# Patient Record
Sex: FEMALE | URBAN - METROPOLITAN AREA
[De-identification: names, ages, dates, MRNs, and addresses within clinical notes are randomized per-mention and may not be internally consistent; named-entity substitution may affect disease eponyms.]

---

## 2018-03-25 ENCOUNTER — EMERGENCY (EMERGENCY)
Facility: HOSPITAL | Age: 58
LOS: 1 days | Discharge: HOME | End: 2018-03-25
Attending: EMERGENCY MEDICINE | Admitting: EMERGENCY MEDICINE
Payer: SELF-PAY

## 2018-03-25 VITALS
OXYGEN SATURATION: 100 % | RESPIRATION RATE: 18 BRPM | HEART RATE: 94 BPM | SYSTOLIC BLOOD PRESSURE: 164 MMHG | DIASTOLIC BLOOD PRESSURE: 96 MMHG

## 2018-03-25 PROCEDURE — 82962 GLUCOSE BLOOD TEST: CPT

## 2018-03-25 PROCEDURE — 99283 EMERGENCY DEPT VISIT LOW MDM: CPT

## 2018-03-25 NOTE — ED PROVIDER NOTE - SHIFT CHANGE DETAILS
***ATTENDING ADDENDUM (Dr. Jesús Earl): I have received handoff from Kathy. Reassess patient and determine need for diagnostics. Likely need for social work consultation in AM. Will continue to observe and monitor closely.

## 2018-03-25 NOTE — ED PROVIDER NOTE - DIAGNOSIS COUNSELING, MDM
conducted a detailed discussion... I had a detailed discussion with the patient and/or guardian regarding the historical points, exam findings, and any diagnostic results supporting the discharge/admit diagnosis. **ATTENDING ADDENDUM (Dr. Jesús Earl): patient declined and was not cooperative with attempts at conversation and education throughout ED course.

## 2018-03-25 NOTE — ED ADULT NURSE REASSESSMENT NOTE - NS ED NURSE REASSESS COMMENT FT1
Patient refuses IV when attempted. Patient swings her arms and begins shouting when attempted. Attending Kathy made aware. MD Chavez states she will speak to patient and it is ok to hold off on IV and labs for now.

## 2018-03-25 NOTE — ED PROVIDER NOTE - PLAN OF CARE
ATTENDING ADDENDUM (Dr. Jesús Earl): Goals of care include resolution of emergent/urgent symptoms and concerns, and restoration to baseline level of homeostasis. ATTENDING ADDENDUM (Dr. Jesús Earl): (1) anticipatory guidance provided  (2) rest  (3) outpatient follow-up with your primary care physician/provider (4) return if symptoms worsen, persist, or do not resolve (5) followup with  (materials provided during ED encounter)

## 2018-03-25 NOTE — ED PROVIDER NOTE - OBJECTIVE STATEMENT
57F with unknown PMH BIB EMS after being found on the street as per triage. Patient upon evaluation refusing any evaluation, refusing to speak with providers, aggressive when attempts for physical exam made. Patient does not appear to be in any distress. Patient stating to be left alone to sleep. 57F with unknown PMH BIB EMS after being found on the street as per triage. Patient upon evaluation refusing any evaluation, refusing to speak with providers, aggressive when attempts for physical exam made. Patient does not appear to be in any distress. Patient stating to be left alone to sleep. Appears undomiciled. 57F with unknown PMH BIB EMS after being found on the street as per triage "wanting to be cleaned up". Patient upon evaluation refusing any evaluation, refusing to speak with providers, aggressive when attempts for physical exam made. Patient does not appear to be in any distress. Patient stating to be left alone to sleep. Appears undomiciled.

## 2018-03-25 NOTE — ED PROVIDER NOTE - CONDUCTED A DETAILED DISCUSSION WITH PATIENT AND/OR GUARDIAN REGARDING, MDM
need for outpatient follow-up/return to ED if symptoms worsen, persist or questions arise/**ATTENDING ADDENDUM (Dr. Jesús Earl): Anticipatory guidance provided.

## 2018-03-25 NOTE — ED PROVIDER NOTE - CARE PLAN
Principal Discharge DX:	Homeless single person  Goal:	ATTENDING ADDENDUM (Dr. Jesús Earl): Goals of care include resolution of emergent/urgent symptoms and concerns, and restoration to baseline level of homeostasis.  Assessment and plan of treatment:	ATTENDING ADDENDUM (Dr. Jesús Earl): (1) anticipatory guidance provided  (2) rest  (3) outpatient follow-up with your primary care physician/provider (4) return if symptoms worsen, persist, or do not resolve (5) followup with  (materials provided during ED encounter)

## 2018-03-25 NOTE — ED PROVIDER NOTE - PROGRESS NOTE DETAILS
**ATTENDING ADDENDUM (Dr. Jesús Earl): patient serially evaluated throughout ED course. NO acute deterioration up to this time in the ED. Patient remains considerably agitated when provoked (when asked questions) but is able to speak without slurring words and does open eyes spontaneously. Appears to be appropriately angered when bothered, but then is consolable. Able to protect airway. Poor hygiene and disheveled. Prefers to sleep but easily arousable. Does not want or consent to diagnostics at this time. Offered room and food to clean self; declined. For social work evaluation in the AM. Will continue to observe and monitor closely. Anticipatory guidance provided. Keyla: patient noted to be ambulatory, yelling for directions to bathroom, continuing to refuse further discussions. Patient subsequently returned to bed and refused further interaction. Keyla: patient noted to be ambulatory, yelling for directions to bathroom, continuing to refuse further discussions. Patient subsequently returned to bed and refused further interaction.  **ATTENDING ADDENDUM (Dr. Jesús Earl): agree with above notation by EM resident Keyla. Ambulatory without support. Still argumentative at times but consolable. Will continue to observe and monitor closely. Will likely require social work consultation/evaluation in AM. **ATTENDING ADDENDUM (Dr. Jesús Earl): patient serially evaluated throughout ED course. NO acute deterioration up to this time in the ED. However, patient, although easily arousable, demonstrates agitation at all staff (even  Tali) despite attempts at communication and all beneficent actions. I do NOT believe this patient is psychotic, suicidal, homicidal, or demonstrating hallucinations, illusions, or paranoia. I do NOT believe this patient is demonstrating hypoxia, intoxication, or other findings indicating emergent medical, surgical, infectious, malignant, OB-GYN, psychiatric or other worrisome signs or symptoms (except belligerence) that warrant emergent admission and forced involuntary ED diagnostics/therapeutics. Patient appears to demonstrate enough autonomy at this time. Therefore, Agree with discharge with close outpatient followup with  and medicine clinic or other primary care physician/provider as noted in EMR.

## 2018-03-25 NOTE — ED PROVIDER NOTE - MEDICAL DECISION MAKING DETAILS
57F unknown hx found on street. Refusing all services to be reassessed 57F unknown hx found on street. Refusing all services to be reassessed    JUANA Chavez MD: Pt is a 58 y/o female with unknown PMH who was BIB EMS after being found on the streets, appearing confused. Pt. asked for someone to "clean me up" on arrival and to "just let me sleep." Pt. appeared to be undomiciled, as her clothes were dirty and she had belongings in a bag with her. Pt is uncooperative, unwilling to open eyes, speak with examiners or answer questions. Pt pulls away and in response to pain, performing purposeful movements, pulling covers over her head. No external evidence of trauma on exam.  DDx: homeless, psychiatric illness, medical d/o  Plan: medical evaluation, social work in AM 57F unknown hx found on street. Refusing all services to be reassessed. Does not appear to have any gross injury.     JUANA Chavez MD: Pt is a 58 y/o female with unknown PMH who was BIB EMS after being found on the streets, appearing confused. Pt. asked for someone to "clean me up" on arrival and to "just let me sleep." Pt. appeared to be undomiciled, as her clothes were dirty and she had belongings in a bag with her. Pt is uncooperative, unwilling to open eyes, speak with examiners or answer questions. Pt pulls away and in response to pain, performing purposeful movements, pulling covers over her head. No external evidence of trauma on exam.  DDx: homeless, psychiatric illness, medical d/o  Plan: medical evaluation, social work in AM

## 2018-03-26 VITALS
TEMPERATURE: 97 F | RESPIRATION RATE: 15 BRPM | DIASTOLIC BLOOD PRESSURE: 69 MMHG | SYSTOLIC BLOOD PRESSURE: 128 MMHG | OXYGEN SATURATION: 99 % | HEART RATE: 96 BPM

## 2018-03-26 NOTE — ED ADULT NURSE REASSESSMENT NOTE - NS ED NURSE REASSESS COMMENT FT1
Patient continually refusing all care. Discharged home to follow up at medicine clinic. When patient instructed that she will be discharged, patient verbally abusive to ED RN and turn over in bed so backside facing nurse. RN notify charge nurse and security called in order to facilitate safe discharge.

## 2018-03-26 NOTE — ED ADULT NURSE REASSESSMENT NOTE - NS ED NURSE REASSESS COMMENT FT1
Report received from Evelia STEINER. Vital signs performed at 630 WNL. Upon day ED RN patient assessment, patient refusing vital signs being performed. Patient states "get the fuck away" when ED RN attempt assessment. MD notified. Night shift RNs confirm patient denying all services. Patient is awaiting Social Work this morning. Patient resting in stretcher. No acute distress noted. Will continue to monitor. Report received from Evelia STEINER. Vital signs performed at 630 WNL. Upon day ED RN patient assessment, patient refusing vital signs being performed. Patient states "get the fuck away" when ED RN attempt assessment. Rajat GOLDBERG notified. Night shift RNs confirm patient denying all services. Patient is awaiting Social Work this morning. Patient resting in stretcher. No acute distress noted. Will continue to monitor.

## 2018-03-26 NOTE — ED ADULT NURSE REASSESSMENT NOTE - NS ED NURSE REASSESS COMMENT FT1
Rajat GOLDBERG and social work at bedside. Patient cursing at staff. Refusing to allow MD to reassess patient

## 2018-03-26 NOTE — ED ADULT NURSE REASSESSMENT NOTE - NS ED NURSE REASSESS COMMENT FT1
As per MD Ramires, continue to allow patient to sleep. Probable SW consult in the AM. Patient is sleeping, easily arousable, VSS, NAD.

## 2022-04-21 NOTE — ED PROVIDER NOTE - DISCHARGE DATE
April 21, 2022        Patricia Gaitan  [address]  [place] [address]        David Gomez,    Thank you for taking the time to talk with me today.  During our conversation, we talked about tools and resources to lower your health risks and reach your personal goals for healthy living.       I have enclosed the following resources for you to review:     Diabetes In Control Booklet, Diabetes Action Plan    I would like to call you and follow up on the resources I am sending you.  I can also be contacted at [phone] and am available Monday thru Friday 8:00 AM to 4:00 PM.    We want to partner with you to help you get the right care, at the right time, in the right place.  This is a free, confidential service to help you and your family better handle your healthcare needs.     Congratulations on taking the next steps in the journey to improve your health!   Sincerely,    Patricia Hardy, RN  [facility]: Diabetes In Control Booklet, Diabetes Action Plan  
26-Mar-2018